# Patient Record
(demographics unavailable — no encounter records)

---

## 2019-08-24 NOTE — XRAY REPORT
CHEST 1 VIEW 8/24/2019 9:51 PM



INDICATION / CLINICAL INFORMATION:

Overdose.



COMPARISON: 

None available.



FINDINGS:



SUPPORT DEVICES: None.

HEART / MEDIASTINUM: No significant abnormality. 

LUNGS / PLEURA: No significant pulmonary or pleural abnormality. No pneumothorax. 



ADDITIONAL FINDINGS: No significant additional findings.



IMPRESSION:

No acute abnormality of the chest.



Signer Name: Khoi Jarvis MD 

Signed: 8/24/2019 10:18 PM

 Workstation Name: [id number]

## 2019-08-24 NOTE — EVENT NOTE
Date: 08/24/19





patient with caustic ingestion, will recommend early endoscopy (tomorrow AM) as 

long as no signs/symptoms of perforation, as early endoscopy can accurately 

predict risk of stricture formation


Please maintain NPO

## 2019-08-24 NOTE — HISTORY AND PHYSICAL REPORT
History of Present Illness


Date of examination: 08/24/19


History of present illness: 


21-year-old woman with no medical problems was brought to the emergency room for

evaluation.  Family at bedside state that she drinks 6 beers today, she then 

felt dizzy and does not remember what happened.  Also state that she has been 

demon possessed, edema and still hurry to do things to herself.  Today after 

drinking 6 beers she drinks some cleaning agent, but she does not know the name,

she was not aware that she was drinking 8, she said demons told her to do it.  

Currently he has burning in her stomach, no psychiatric history


Review Of  Systems:


Constitutional: no weight loss, fever, chills


Ears, eyes, nose, mouth and throat: no nasal congestion, no nasal discharge, no 

sinus pressure, blurry vision, diplopia


Neck: No neck pain or rigidity.


Cardiovascular: No  palpitations, chest pain


Respiratory: No shortness of breath, cough


Gastrointestinal: No hematochezia, abdominal pain


Genitourinary : no dysuria, frequency , hematuria


Musculoskeletal: no muscle ache , joint pain


Integumentary: no rash, no pruritis


Neurological: no parathesias, focal weakness


Endocrine: no cold or heat intolerance, no polyuria or polydipsia


Hematologic/Lymphatic: no easy bruising, no easy bleeding, no gland swelling


Allergic/Immunologic: no urticaria, no angioedema.





PAST MEDICAL HISTORY:None





PAST SURGICAL HISTORY:None





FAMILY HISTORY:hypertension, diabetes





SOCIAL HISTORY: Denies  tobacco, drugs,  +alcohol











Medications and Allergies


                                    Allergies











Allergy/AdvReac Type Severity Reaction Status Date / Time


 


No Known Allergies Allergy   Unverified 08/24/19 22:46











                                Home Medications











 Medication  Instructions  Recorded  Confirmed  Last Taken  Type


 


No Known Home Medications [No  08/24/19 08/24/19 Unknown History





Reported Home Medications]     











Active Meds: 


Active Medications





Acetaminophen (Tylenol)  650 mg PO Q4H PRN


   PRN Reason: Pain MILD(1-3)/Fever >100.5/HA


Enoxaparin Sodium (Lovenox)  30 mg SUB-Q QDAY [facility]


Sodium Chloride (Nacl 0.9% 1000 Ml)  1,000 mls @ 150 mls/hr IV AS DIRECT [facility]


Morphine Sulfate (Morphine)  2 mg IV Q4H PRN


   PRN Reason: Pain, Moderate (4-6)


Ondansetron HCl (Zofran)  4 mg IV Q8H PRN


   PRN Reason: Nausea And Vomiting


Sodium Chloride (Sodium Chloride Flush Syringe 10 Ml)  10 ml IV BID [facility]


Sodium Chloride (Sodium Chloride Flush Syringe 10 Ml)  10 ml IV PRN PRN


   PRN Reason: LINE FLUSH











Exam





- Physical Exam


Narrative exam: 


General Apperance: The patient sitting in bed no acute distress


HEENT: Normocephalic, atraumatic.  Pupils equally round and reactive to light, 

extraocular movement intact, and no sclericterus or JVD or thyromegaly or 

nodule.  Neck supple, no carotid bruit, mucous membranes moist, no exudate or 

erythema


Heart: S1-S2, regular is rhythm


Lungs: Clear to auscultation bilaterally, breathing comfortable


Abdomen: Positive bowel sounds, soft, nontender, nondistended, no organomegaly


Extremities: No edema cyanosis clubbing


Skin: no rash, nodule, warm and dry


Neuro:CN 2 -12 intact, motor/sensory intact, speech is fluent








- Constitutional


Vitals: 


                                        











Temp Pulse Resp BP Pulse Ox


 


 98.6 F   111 H  19   108/65   98 


 


 08/24/19 21:20  08/24/19 23:30  08/24/19 23:30  08/24/19 23:30  08/24/19 23:30














Results





- Labs


CBC & Chem 7: 


                                 08/25/19 04:40





                                 08/25/19 04:40


Labs: 


                              Abnormal lab results











  08/24/19 08/24/19 08/24/19 Range/Units





  21:25 21:25 21:25 


 


MCHC  35 H    (30-34)  %


 


BUN   6 L   (7-17)  mg/dL


 


Creatinine   0.6 L   (0.7-1.2)  mg/dL


 


Glucose   109 H   ()  mg/dL


 


AST   70 H   (5-40)  units/L


 


ALT   109 H   (7-56)  units/L


 


Total Creatine Kinase    166 H  ()  units/L


 


Total Protein   8.6 H   (6.3-8.2)  g/dL


 


Salicylates     (2.8-20.0)  mg/dL


 


Acetaminophen     (10.0-30.0)  ug/mL


 


Plasma/Serum Alcohol     (0-0.07)  %














  08/24/19 08/24/19 08/24/19 Range/Units





  21:25 21:25 21:25 


 


MCHC     (30-34)  %


 


BUN     (7-17)  mg/dL


 


Creatinine     (0.7-1.2)  mg/dL


 


Glucose     ()  mg/dL


 


AST     (5-40)  units/L


 


ALT     (7-56)  units/L


 


Total Creatine Kinase     ()  units/L


 


Total Protein     (6.3-8.2)  g/dL


 


Salicylates  < 0.3 L    (2.8-20.0)  mg/dL


 


Acetaminophen   < 5.0 L   (10.0-30.0)  ug/mL


 


Plasma/Serum Alcohol    0.18 H  (0-0.07)  %














- Imaging and Cardiology


Chest x-ray: report reviewed





Assessment and Plan


Assessment


Ingestion of caustic agent


Suicide attempt





Plan


Start IV fluids, Protonix, GI was consulted to see the patient


Consult psych, 1013, DVT prophylaxis

## 2019-08-24 NOTE — XRAY REPORT
CERVICAL SPINE 3 VIEWS.



INDICATION / CLINICAL INFORMATION:

fell intox psych



COMPARISON:

None available.

 

FINDINGS:



BONES / JOINT(S): No acute fracture or subluxation. No significant arthritis.

SOFT TISSUES: No significant abnormality.



ADDITIONAL FINDINGS: None.







Signer Name: Alon Bales MD 

Signed: 8/24/2019 11:40 PM

 Workstation Name: [id number]

## 2019-08-24 NOTE — CAT SCAN REPORT
NONENHANCED CT SCAN OF THE NECK:



INDICATION:

Fell down; underresponsive; "drank ammonia"



TECHNIQUE: Routine CT head without contrast. Sagittal and coronal reformatted images were obtained. A
ll CT scans at this location are performed using CT dose reduction for ALARA by means of automated ex
posure control.



COMPARISON: 

None.



FINDINGS:



BRAIN / INTRACRANIAL CONTENTS: No acute hemorrhage, mass effect, midline shift, hydrocephalus, or acu
te, large territorial infarct. No chronic infarct or focal atrophy. Normal brain volume and ventricul
ar/sulcal size for age. No significant white matter abnormality. Basal ganglia are normal.



CRANIOCERVICAL JUNCTION: No significant abnormality.



ORBITS: No significant abnormality of visualized orbits.

SINUSES / MASTOIDS: No significant abnormality of the visualized paranasal sinuses or mastoid air stephon
ls.



ADDITIONAL FINDINGS: None. 



IMPRESSION:

CT scan of the brain.



Signer Name: Casie Gaviria MD 

Signed: 8/24/2019 10:51 PM

 Workstation Name: [id number]

## 2019-08-25 NOTE — GASTROENTEROLOGY CONSULTATION
History of Present Illness





- Reason for Consult


Consult date: 19


Caustic Ingestion


Requesting physician: WINNIE SHULTZ





- History of Present Illness





 #[id number]





Patient reports drinking combination of bleach/ammonia from her bathroom 

cleaning supplies, she reports not sure of the volume that she drank, just that 

she drank until she started to feel bad.


Currently reports mouth a little sore, otherwise feeling ok.





Past History


Past Medical History: No medical history, other (Unable to obtain )


Past Surgical History: , Other


Social history: no significant social history, other (Unable to obtain )


Family history: no significant family history





Medications and Allergies


                                    Allergies











Allergy/AdvReac Type Severity Reaction Status Date / Time


 


No Known Allergies Allergy   Unverified 19 22:46











                                Home Medications











 Medication  Instructions  Recorded  Confirmed  Last Taken  Type


 


No Known Home Medications [No  19 Unknown History





Reported Home Medications]     











Active Meds: 


Active Medications





Acetaminophen (Tylenol)  650 mg PO Q4H PRN


   PRN Reason: Pain MILD(1-3)/Fever >100.5/HA


Enoxaparin Sodium (Lovenox)  40 mg SUB-Q QDAY [facility]


   Last Admin: 19 09:44 Dose:  40 mg


   Documented by: 


Sodium Chloride (Nacl 0.9% 1000 Ml)  1,000 mls @ 150 mls/hr IV AS DIRECT [facility]


   Last Admin: 19 04:58 Dose:  150 mls/hr


   Documented by: 


Sodium Chloride (Nacl 0.9% 1000 Ml)  1,000 mls @ 50 mls/hr IV AS DIRECT CALI


Morphine Sulfate (Morphine)  2 mg IV Q4H PRN


   PRN Reason: Pain, Moderate (4-6)


Ondansetron HCl (Zofran)  4 mg IV Q8H PRN


   PRN Reason: Nausea And Vomiting


Pantoprazole Sodium (Protonix)  40 mg IV BID [facility]


   Last Admin: 19 09:44 Dose:  40 mg


   Documented by: 


Sodium Chloride (Sodium Chloride Flush Syringe 10 Ml)  10 ml IV BID [facility]


   Last Admin: 19 09:44 Dose:  10 ml


   Documented by: 


Sodium Chloride (Sodium Chloride Flush Syringe 10 Ml)  10 ml IV PRN PRN


   PRN Reason: LINE FLUSH











Review of Systems





- Review of Systems


All systems: negative (mouth pain)





Exam





- Constitutional


Vital Signs: 


                                        











Temp Pulse Resp BP Pulse Ox


 


 98.4 F   98 H  16   117/71   99 


 


 19 08:00  19 09:00  19 08:00  19 06:00  19 08:00











General appearance: no acute distress





- EENT


Eyes: EOM intact


ENT: other (sloughing of the mucosa of the tongue which is slightly edamatous 

and red; posterior oropharynx does not appear to have significant damage in my 

limited view)





- Neck


Neck: supple





- Respiratory


Respiratory effort: normal





- Cardiovascular


Rhythm: regular





- Gastrointestinal


General gastrointestinal: Present: soft, non-tender, normal bowel sounds





- Integumentary


Integumentary: Present: warm, dry





- Musculoskeletal


Musculoskeletal: normal





- Neurologic


Neurological: alert and oriented x3





- Psychiatric


Psychiatric: appropriate mood/affect





- Labs


CBC & Chem 7: 


                                 19 04:40





                                 19 04:40


Lab Results: 


                         Laboratory Results - last 24 hr











  19





  21:25 21:25 21:25


 


WBC  7.6  


 


RBC  4.72  


 


Hgb  13.8  


 


Hct  39.9  


 


MCV  85  


 


MCH  29  


 


MCHC  35 H  


 


RDW  13.2  


 


Plt Count  395  


 


Lymph % (Auto)   


 


Mono % (Auto)   


 


Eos % (Auto)   


 


Baso % (Auto)   


 


Lymph #   


 


Mono #   


 


Eos #   


 


Baso #   


 


Seg Neutrophils %   


 


Seg Neutrophils #   


 


PT   13.4 


 


INR   1.05 


 


APTT   27.7 


 


Sodium    145


 


Potassium    4.0


 


Chloride    105.8


 


Carbon Dioxide    22


 


Anion Gap    21


 


BUN    6 L


 


Creatinine    0.6 L


 


Estimated GFR    > 60


 


BUN/Creatinine Ratio    10


 


Glucose    109 H


 


Calcium    9.3


 


Magnesium   


 


Total Bilirubin    0.20


 


AST    70 H


 


ALT    109 H


 


Alkaline Phosphatase    72


 


Total Creatine Kinase   


 


Total Protein    8.6 H


 


Albumin    4.7


 


Albumin/Globulin Ratio    1.2


 


Urine Color   


 


Urine Turbidity   


 


Urine pH   


 


Ur Specific Gravity   


 


Urine Protein   


 


Urine Glucose (UA)   


 


Urine Ketones   


 


Urine Blood   


 


Urine Nitrite   


 


Urine Bilirubin   


 


Urine Urobilinogen   


 


Ur Leukocyte Esterase   


 


Urine WBC (Auto)   


 


Urine RBC (Auto)   


 


U Epithel Cells (Auto)   


 


Urine HCG, Qual   


 


Salicylates   


 


Urine Opiates Screen   


 


Urine Methadone Screen   


 


Acetaminophen   


 


Ur Barbiturates Screen   


 


Ur Phencyclidine Scrn   


 


Ur Amphetamines Screen   


 


U Benzodiazepines Scrn   


 


Urine Cocaine Screen   


 


U Marijuana (THC) Screen   


 


Drugs of Abuse Note   


 


Plasma/Serum Alcohol   














  19





  21:25 21:25 21:25


 


WBC  7.6  


 


RBC  4.71  


 


Hgb  13.8  


 


Hct  40.2  


 


MCV  85  


 


MCH  29  


 


MCHC  34  


 


RDW  13.2  


 


Plt Count  396  


 


Lymph % (Auto)   


 


Mono % (Auto)   


 


Eos % (Auto)   


 


Baso % (Auto)   


 


Lymph #   


 


Mono #   


 


Eos #   


 


Baso #   


 


Seg Neutrophils %   


 


Seg Neutrophils #   


 


PT   13.3 


 


INR   1.04 


 


APTT   


 


Sodium   


 


Potassium   


 


Chloride   


 


Carbon Dioxide   


 


Anion Gap   


 


BUN   


 


Creatinine   


 


Estimated GFR   


 


BUN/Creatinine Ratio   


 


Glucose   


 


Calcium   


 


Magnesium    2.20


 


Total Bilirubin   


 


AST   


 


ALT   


 


Alkaline Phosphatase   


 


Total Creatine Kinase    166 H


 


Total Protein   


 


Albumin   


 


Albumin/Globulin Ratio   


 


Urine Color   


 


Urine Turbidity   


 


Urine pH   


 


Ur Specific Gravity   


 


Urine Protein   


 


Urine Glucose (UA)   


 


Urine Ketones   


 


Urine Blood   


 


Urine Nitrite   


 


Urine Bilirubin   


 


Urine Urobilinogen   


 


Ur Leukocyte Esterase   


 


Urine WBC (Auto)   


 


Urine RBC (Auto)   


 


U Epithel Cells (Auto)   


 


Urine HCG, Qual   


 


Salicylates   


 


Urine Opiates Screen   


 


Urine Methadone Screen   


 


Acetaminophen   


 


Ur Barbiturates Screen   


 


Ur Phencyclidine Scrn   


 


Ur Amphetamines Screen   


 


U Benzodiazepines Scrn   


 


Urine Cocaine Screen   


 


U Marijuana (THC) Screen   


 


Drugs of Abuse Note   


 


Plasma/Serum Alcohol   














  19





  21:25 21:25 21:25


 


WBC   


 


RBC   


 


Hgb   


 


Hct   


 


MCV   


 


MCH   


 


MCHC   


 


RDW   


 


Plt Count   


 


Lymph % (Auto)   


 


Mono % (Auto)   


 


Eos % (Auto)   


 


Baso % (Auto)   


 


Lymph #   


 


Mono #   


 


Eos #   


 


Baso #   


 


Seg Neutrophils %   


 


Seg Neutrophils #   


 


PT   


 


INR   


 


APTT   


 


Sodium   


 


Potassium   


 


Chloride   


 


Carbon Dioxide   


 


Anion Gap   


 


BUN   


 


Creatinine   


 


Estimated GFR   


 


BUN/Creatinine Ratio   


 


Glucose   


 


Calcium   


 


Magnesium   


 


Total Bilirubin   


 


AST   


 


ALT   


 


Alkaline Phosphatase   


 


Total Creatine Kinase   


 


Total Protein   


 


Albumin   


 


Albumin/Globulin Ratio   


 


Urine Color   


 


Urine Turbidity   


 


Urine pH   


 


Ur Specific Gravity   


 


Urine Protein   


 


Urine Glucose (UA)   


 


Urine Ketones   


 


Urine Blood   


 


Urine Nitrite   


 


Urine Bilirubin   


 


Urine Urobilinogen   


 


Ur Leukocyte Esterase   


 


Urine WBC (Auto)   


 


Urine RBC (Auto)   


 


U Epithel Cells (Auto)   


 


Urine HCG, Qual   


 


Salicylates  < 0.3 L  


 


Urine Opiates Screen   


 


Urine Methadone Screen   


 


Acetaminophen   < 5.0 L 


 


Ur Barbiturates Screen   


 


Ur Phencyclidine Scrn   


 


Ur Amphetamines Screen   


 


U Benzodiazepines Scrn   


 


Urine Cocaine Screen   


 


U Marijuana (THC) Screen   


 


Drugs of Abuse Note   


 


Plasma/Serum Alcohol    0.18 H














  19





  23:18 23:18 04:40


 


WBC    9.5


 


RBC    4.45


 


Hgb    13.2


 


Hct    38.4


 


MCV    86


 


MCH    30


 


MCHC    34


 


RDW    13.1 L


 


Plt Count    375


 


Lymph % (Auto)    38.4 H


 


Mono % (Auto)    7.5 H


 


Eos % (Auto)    1.2


 


Baso % (Auto)    0.7


 


Lymph #    3.6


 


Mono #    0.7


 


Eos #    0.1


 


Baso #    0.1


 


Seg Neutrophils %    52.2


 


Seg Neutrophils #    5.0


 


PT   


 


INR   


 


APTT   


 


Sodium   


 


Potassium   


 


Chloride   


 


Carbon Dioxide   


 


Anion Gap   


 


BUN   


 


Creatinine   


 


Estimated GFR   


 


BUN/Creatinine Ratio   


 


Glucose   


 


Calcium   


 


Magnesium   


 


Total Bilirubin   


 


AST   


 


ALT   


 


Alkaline Phosphatase   


 


Total Creatine Kinase   


 


Total Protein   


 


Albumin   


 


Albumin/Globulin Ratio   


 


Urine Color  Straw  


 


Urine Turbidity  Clear  


 


Urine pH  5.0  


 


Ur Specific Gravity  1.006  


 


Urine Protein  <15 mg/dl  


 


Urine Glucose (UA)  Neg  


 


Urine Ketones  Neg  


 


Urine Blood  Neg  


 


Urine Nitrite  Neg  


 


Urine Bilirubin  Neg  


 


Urine Urobilinogen  < 2.0  


 


Ur Leukocyte Esterase  Neg  


 


Urine WBC (Auto)  1.0  


 


Urine RBC (Auto)  < 1.0  


 


U Epithel Cells (Auto)  1.0  


 


Urine HCG, Qual   


 


Salicylates   


 


Urine Opiates Screen   Presumptive negative 


 


Urine Methadone Screen   Presumptive negative 


 


Acetaminophen   


 


Ur Barbiturates Screen   Presumptive negative 


 


Ur Phencyclidine Scrn   Presumptive negative 


 


Ur Amphetamines Screen   Presumptive negative 


 


U Benzodiazepines Scrn   Presumptive negative 


 


Urine Cocaine Screen   Presumptive negative 


 


U Marijuana (THC) Screen   Presumptive negative 


 


Drugs of Abuse Note   Disclamer 


 


Plasma/Serum Alcohol   














  19





  04:40 09:32


 


WBC  


 


RBC  


 


Hgb  


 


Hct  


 


MCV  


 


MCH  


 


MCHC  


 


RDW  


 


Plt Count  


 


Lymph % (Auto)  


 


Mono % (Auto)  


 


Eos % (Auto)  


 


Baso % (Auto)  


 


Lymph #  


 


Mono #  


 


Eos #  


 


Baso #  


 


Seg Neutrophils %  


 


Seg Neutrophils #  


 


PT  


 


INR  


 


APTT  


 


Sodium  145 


 


Potassium  4.0 


 


Chloride  107.5 H 


 


Carbon Dioxide  21 L 


 


Anion Gap  21 


 


BUN  6 L 


 


Creatinine  0.6 L 


 


Estimated GFR  > 60 


 


BUN/Creatinine Ratio  10 


 


Glucose  86 


 


Calcium  8.7 


 


Magnesium  


 


Total Bilirubin  


 


AST  


 


ALT  


 


Alkaline Phosphatase  


 


Total Creatine Kinase  


 


Total Protein  


 


Albumin  


 


Albumin/Globulin Ratio  


 


Urine Color  


 


Urine Turbidity  


 


Urine pH  


 


Ur Specific Gravity  


 


Urine Protein  


 


Urine Glucose (UA)  


 


Urine Ketones  


 


Urine Blood  


 


Urine Nitrite  


 


Urine Bilirubin  


 


Urine Urobilinogen  


 


Ur Leukocyte Esterase  


 


Urine WBC (Auto)  


 


Urine RBC (Auto)  


 


U Epithel Cells (Auto)  


 


Urine HCG, Qual   Negative


 


Salicylates  


 


Urine Opiates Screen  


 


Urine Methadone Screen  


 


Acetaminophen  


 


Ur Barbiturates Screen  


 


Ur Phencyclidine Scrn  


 


Ur Amphetamines Screen  


 


U Benzodiazepines Scrn  


 


Urine Cocaine Screen  


 


U Marijuana (THC) Screen  


 


Drugs of Abuse Note  


 


Plasma/Serum Alcohol  














Assessment and Plan





Caustic ingestion with bleach and ammonia, will need EGD for risk stratification

for stricture formation and perforation/fistulization, no evidence for 

perforation so will proceed with urgent EGD.





This was all discussed in detail with the patient using the telephone 

 and questions were answered





Long term, patient will be at increased risk of esophageal cancer and so will 

need to follow up with me starting in  (10 years after the ingestion) as 

long as remains asymptomatic with no dysphagia, if she develops symptoms then 

she would need to see me sooner





Final recs based upon EGD results





- Patient Problems


(1) Ingestion of caustic substance


Current Visit: Yes   Status: Acute

## 2019-08-25 NOTE — POST ANESTHESIA EVALUATION
- Post Anesthesia Evaluation


Patient Participated: Yes


Airway Patent: Yes


Stable Respiratory Function: Yes


Nausea/Vomiting: No


Temp > 96.8F: Yes


Pain Manageable: Yes


Adequeate Hydration: Yes (Required extra IVF)


Anesthesia Complications: No


Block Receding Appropriately: Not Applicable


Patient on Ventilator: No

## 2019-08-25 NOTE — OPERATIVE REPORT
Operative Report


Operative Report: 





DOS: 8/25/19


 


SURGEON: Dilan Alvarez MD


 


EGD REPORT


 


PREOPERATIVE DIAGNOSIS and POSTOPERATIVE DIAGNOSIS: Caustic Ingestion


 


ESTIMATED BLOOD LOSS: none


 


DESCRIPTION OF PROCEDURE: A high-resolution EGD scope was passed through the 

oropharynx, esophagus, stomach, and second portion of duodenum. The scope was 

carefully withdrawn. Retroflexion was performed in the stomach. At the end of 

the procedure, the scope was cleaned using normal technique. Vital signs 

monitored continuously throughout.


 


SEDATION: Provided by [facility].


 


COMPLICATIONS: None.


 


FINDINGS: 


* Normal duodenum


* Mild diffuse gastritis of the stomach with erythema


* GE junction at 37cm from the incisors


* Per Zargar Classification, patient with 2A grade esophageal injury.  

  Specifically, diffuse mucosal exudates and sloughing of mucosa without 

  necrosis nor deep ulcers


 


RECOMMENDATIONS: 


* May start liquid diet now


* May advance to regular diet tomorrow if patient clinically continues to do 

  well


* Recommend PPI to prevent stress ulcers for the next 30 days


* Patient now unfortunately is at significantly elevated risk for esophageal 

  cancer and requires EGD every 2-3 years starting in about 10 years from now.


* Patient fortunately has low risk of perforation or stricture formation based 

  upon the EGD appearance and therefore after discharge can follow up with GI 

  PRN (until 10 years from now at which point she will need routine EGD's as 

  above)


* Lastly, final disposition for psych eval given suicide attempt

## 2019-08-25 NOTE — CONSULTATION
History of Present Illness





- Reason for Consult


Consult date: 08/25/19


Reason for consult: Mental Health Evaluation


Requesting physician: WINNIE SHULTZ





- Chief Complaint


Chief complaint: 


"The patient speak [place]"




















- History of Present Psychiatric Illness


21 y.o.  female who presented to the ER for suicide attempt by drinking 

bleach and ammonia per the record. Today the assessment could not be completed 

because the  phone isn't working. Will attempt to assess the patient 

in 24 hours. 





Medications and Allergies


                                    Allergies











Allergy/AdvReac Type Severity Reaction Status Date / Time


 


No Known Allergies Allergy   Unverified 08/24/19 22:46











                                Home Medications











 Medication  Instructions  Recorded  Confirmed  Last Taken  Type


 


No Known Home Medications [No  08/24/19 08/24/19 Unknown History





Reported Home Medications]     











Active Meds: 


Active Medications





Acetaminophen (Tylenol)  650 mg PO Q4H PRN


   PRN Reason: Pain MILD(1-3)/Fever >100.5/HA


Enoxaparin Sodium (Lovenox)  40 mg SUB-Q QDAY [facility]


   Last Admin: 08/25/19 09:44 Dose:  40 mg


   Documented by: 


Sodium Chloride (Nacl 0.9% 1000 Ml)  1,000 mls @ 150 mls/hr IV AS DIRECT CALI


   Last Admin: 08/25/19 04:58 Dose:  150 mls/hr


   Documented by: 


Sodium Chloride (Nacl 0.9% 1000 Ml)  1,000 mls @ 50 mls/hr IV AS DIRECT CALI


Morphine Sulfate (Morphine)  2 mg IV Q4H PRN


   PRN Reason: Pain, Moderate (4-6)


Ondansetron HCl (Zofran)  4 mg IV Q8H PRN


   PRN Reason: Nausea And Vomiting


Pantoprazole Sodium (Protonix)  40 mg IV BID [facility]


   Last Admin: 08/25/19 09:44 Dose:  40 mg


   Documented by: 


Sodium Chloride (Sodium Chloride Flush Syringe 10 Ml)  10 ml IV BID [facility]


   Last Admin: 08/25/19 09:44 Dose:  10 ml


   Documented by: 


Sodium Chloride (Sodium Chloride Flush Syringe 10 Ml)  10 ml IV PRN PRN


   PRN Reason: LINE FLUSH











Past psychiatric history





- Past Medical History


Past Medical History: other (Unable to obtain )


Past Surgical History: Other (Unable to obtain )





- past Psychiatric treatment and history


psychiatric treatment history: 


Unable to obtain a psy hx and fam psy hx. 








- Social History


Social history: other (Unable to obtain )





Mental Status Exam





- Vital signs


                                Last Vital Signs











Temp  98.4 F   08/25/19 08:00


 


Pulse  98 H  08/25/19 09:00


 


Resp  16   08/25/19 08:00


 


BP  117/71   08/25/19 06:00


 


Pulse Ox  99   08/25/19 08:00














- Exam


Narrative exam: 


Unable to complete the MSE at this time. 








Results


Result Diagrams: 


                                 08/25/19 04:40





                                 08/25/19 04:40


                              Abnormal lab results











  08/24/19 08/24/19 08/24/19 Range/Units





  21:25 21:25 21:25 


 


MCHC  35 H    (30-34)  %


 


RDW     (13.2-15.2)  %


 


Lymph % (Auto)     (13.4-35.0)  %


 


Mono % (Auto)     (0.0-7.3)  %


 


Chloride     ()  mmol/L


 


Carbon Dioxide     (22-30)  mmol/L


 


BUN   6 L   (7-17)  mg/dL


 


Creatinine   0.6 L   (0.7-1.2)  mg/dL


 


Glucose   109 H   ()  mg/dL


 


AST   70 H   (5-40)  units/L


 


ALT   109 H   (7-56)  units/L


 


Total Creatine Kinase    166 H  ()  units/L


 


Total Protein   8.6 H   (6.3-8.2)  g/dL


 


Salicylates     (2.8-20.0)  mg/dL


 


Acetaminophen     (10.0-30.0)  ug/mL


 


Plasma/Serum Alcohol     (0-0.07)  %














  08/24/19 08/24/19 08/24/19 Range/Units





  21:25 21:25 21:25 


 


MCHC     (30-34)  %


 


RDW     (13.2-15.2)  %


 


Lymph % (Auto)     (13.4-35.0)  %


 


Mono % (Auto)     (0.0-7.3)  %


 


Chloride     ()  mmol/L


 


Carbon Dioxide     (22-30)  mmol/L


 


BUN     (7-17)  mg/dL


 


Creatinine     (0.7-1.2)  mg/dL


 


Glucose     ()  mg/dL


 


AST     (5-40)  units/L


 


ALT     (7-56)  units/L


 


Total Creatine Kinase     ()  units/L


 


Total Protein     (6.3-8.2)  g/dL


 


Salicylates  < 0.3 L    (2.8-20.0)  mg/dL


 


Acetaminophen   < 5.0 L   (10.0-30.0)  ug/mL


 


Plasma/Serum Alcohol    0.18 H  (0-0.07)  %














  08/25/19 08/25/19 Range/Units





  04:40 04:40 


 


MCHC    (30-34)  %


 


RDW  13.1 L   (13.2-15.2)  %


 


Lymph % (Auto)  38.4 H   (13.4-35.0)  %


 


Mono % (Auto)  7.5 H   (0.0-7.3)  %


 


Chloride   107.5 H  ()  mmol/L


 


Carbon Dioxide   21 L  (22-30)  mmol/L


 


BUN   6 L  (7-17)  mg/dL


 


Creatinine   0.6 L  (0.7-1.2)  mg/dL


 


Glucose    ()  mg/dL


 


AST    (5-40)  units/L


 


ALT    (7-56)  units/L


 


Total Creatine Kinase    ()  units/L


 


Total Protein    (6.3-8.2)  g/dL


 


Salicylates    (2.8-20.0)  mg/dL


 


Acetaminophen    (10.0-30.0)  ug/mL


 


Plasma/Serum Alcohol    (0-0.07)  %








All other labs normal.








Assessment and Plan


Assessment and plan: 


Impression: Today the assessment could not be completed at this time because the

 phone isn't working.





Recommendation/Plan: Continue 1013 and attempt to reassess the patient in 24 

hours. 





Will staff with Dr LUH Spencer.

## 2019-08-25 NOTE — PROGRESS NOTE
Assessment and Plan


Assessment and plan: 


History of present illness: 


21-year-old woman with no medical problems was brought to the emergency room for

evaluation.  Family at bedside state that she drinks 6 beers today, she then 

felt dizzy and does not remember what happened.  Also state that she has been 

demon possessed, edema and still hurry to do things to herself.  after drinking 

6 beers she drinks some cleaning agent, but she does not know the name, she was 

not aware that she was drinking 8, she said demons told her to do it.  Currently

he has burning in her stomach, no psychiatric history





SI, attempt


1013, MH consult





Gastritis


-likely due to drinking caustic cleaning material


-EGD report reviewed, dw GI, diet started














History


Interval history: 





she continues to feel depressed, and feels like her demons are chasing her


She has epigastric pain but it is improved


no vomiting


no cp, no sob, no fever





Hospitalist Physical





- Physical exam


Narrative exam: 





General appearance: Present: no acute distress





- EENT


Eyes: Present: PERRL


ENT: hearing intact





- Neck


Neck: Present: supple





- Respiratory


Respiratory effort: normal


Respiratory: bilateral: CTA





- Cardiovascular


Rhythm: regular


Heart Sounds: Present: S1 & S2





- Extremities


Extremities: no ischemia





- Abdominal


General gastrointestinal: soft, non-tender





- Integumentary


Integumentary: Present: clear, warm





- Psychiatric


Psychiatric: no appropriate mood/affect, no intact judgment & insight





- Neurologic


Neurologic: CNII-XII intact, no focal deficits





- Constitutional


Vitals: 


                                        











Temp Pulse Resp BP Pulse Ox


 


 98.7 F   104 H  14   100/49   97 


 


 08/25/19 20:00  08/25/19 17:00  08/25/19 16:00  08/25/19 15:00  08/25/19 16:00














Results





- Labs


CBC & Chem 7: 


                                 08/26/19 09:39





                                 08/26/19 09:39


Labs: 


                             Laboratory Last Values











WBC  9.5 K/mm3 (4.5-11.0)   08/25/19  04:40    


 


RBC  4.45 M/mm3 (3.65-5.03)   08/25/19  04:40    


 


Hgb  13.2 gm/dl (10.1-14.3)   08/25/19  04:40    


 


Hct  38.4 % (30.3-42.9)   08/25/19  04:40    


 


MCV  86 fl (79-97)   08/25/19  04:40    


 


MCH  30 pg (28-32)   08/25/19  04:40    


 


MCHC  34 % (30-34)   08/25/19  04:40    


 


RDW  13.1 % (13.2-15.2)  L  08/25/19  04:40    


 


Plt Count  375 K/mm3 (140-440)   08/25/19  04:40    


 


Lymph % (Auto)  38.4 % (13.4-35.0)  H  08/25/19  04:40    


 


Mono % (Auto)  7.5 % (0.0-7.3)  H  08/25/19  04:40    


 


Eos % (Auto)  1.2 % (0.0-4.3)   08/25/19  04:40    


 


Baso % (Auto)  0.7 % (0.0-1.8)   08/25/19  04:40    


 


Lymph #  3.6 K/mm3 (1.2-5.4)   08/25/19  04:40    


 


Mono #  0.7 K/mm3 (0.0-0.8)   08/25/19  04:40    


 


Eos #  0.1 K/mm3 (0.0-0.4)   08/25/19  04:40    


 


Baso #  0.1 K/mm3 (0.0-0.1)   08/25/19  04:40    


 


Seg Neutrophils %  52.2 % (40.0-70.0)   08/25/19  04:40    


 


Seg Neutrophils #  5.0 K/mm3 (1.8-7.7)   08/25/19  04:40    


 


PT  13.3 Sec. (12.2-14.9)   08/24/19  21:25    


 


PT  13.4 Sec. (12.2-14.9)   08/24/19  21:25    


 


INR  1.04  (0.87-1.13)   08/24/19  21:25    


 


INR  1.05  (0.87-1.13)   08/24/19  21:25    


 


APTT  27.7 Sec. (24.2-36.6)   08/24/19  21:25    


 


Sodium  145 mmol/L (137-145)   08/25/19  04:40    


 


Potassium  4.0 mmol/L (3.6-5.0)   08/25/19  04:40    


 


Chloride  107.5 mmol/L ()  H  08/25/19  04:40    


 


Carbon Dioxide  21 mmol/L (22-30)  L  08/25/19  04:40    


 


  21 mmol/L  08/25/19  04:40    


 


BUN  6 mg/dL (7-17)  L  08/25/19  04:40    


 


  0.6 mg/dL (0.7-1.2)  L  08/25/19  04:40    


 


Estimated GFR  > 60 ml/min  08/25/19  04:40    


 


  10 %  08/25/19  04:40    


 


Glucose  86 mg/dL ()   08/25/19  04:40    


 


Calcium  8.7 mg/dL (8.4-10.2)   08/25/19  04:40    


 


Magnesium  2.20 mg/dL (1.7-2.3)   08/24/19  21:25    


 


  0.20 mg/dL (0.1-1.2)   08/24/19  21:25    


 


AST  70 units/L (5-40)  H  08/24/19  21:25    


 


ALT  109 units/L (7-56)  H  08/24/19  21:25    


 


  72 units/L ()   08/24/19  21:25    


 


  166 units/L ()  H  08/24/19  21:25    


 


  8.6 g/dL (6.3-8.2)  H  08/24/19  21:25    


 


  4.7 g/dL (3.9-5)   08/24/19  21:25    


 


  1.2 %  08/24/19  21:25    


 


HCG, Qual  Negative  (Negative)   08/25/19  08:14    


 


  Straw  (Yellow)   08/24/19  23:18    


 


  Clear  (Clear)   08/24/19  23:18    


 


  5.0  (5.0-7.0)   08/24/19  23:18    


 


Ur Specific Gravity  1.006  (1.003-1.030)   08/24/19  23:18    


 


  <15 mg/dl mg/dL (Negative)   08/24/19  23:18    


 


  Neg mg/dL (Negative)   08/24/19  23:18    


 


  Neg mg/dL (Negative)   08/24/19  23:18    


 


  Neg  (Negative)   08/24/19  23:18    


 


  Neg  (Negative)   08/24/19  23:18    


 


  Neg  (Negative)   08/24/19  23:18    


 


  < 2.0 mg/dL (<2.0)   08/24/19  23:18    


 


Ur Leukocyte Esterase  Neg  (Negative)   08/24/19  23:18    


 


  1.0 /HPF (0.0-6.0)   08/24/19  23:18    


 


  < 1.0 /HPF (0.0-6.0)   08/24/19  23:18    


 


U Epithel Cells (Auto)  1.0 /HPF (0-13.0)   08/24/19  23:18    


 


Urine HCG, Qual  Negative  (Negative)   08/25/19  09:32    


 


Salicylates  < 0.3 mg/dL (2.8-20.0)  L  08/24/19  21:25    


 


  Presumptive negative   08/24/19  23:18    


 


  Presumptive negative   08/24/19  23:18    


 


Acetaminophen  < 5.0 ug/mL (10.0-30.0)  L  08/24/19  21:25    


 


Ur Barbiturates Screen  Presumptive negative   08/24/19  23:18    


 


Ur Phencyclidine Scrn  Presumptive negative   08/24/19  23:18    


 


Ur Amphetamines Screen  Presumptive negative   08/24/19  23:18    


 


U Benzodiazepines Scrn  Presumptive negative   08/24/19  23:18    


 


  Presumptive negative   08/24/19  23:18    


 


U Marijuana (THC) Screen  Presumptive negative   08/24/19  23:18    


 


  Disclamer   08/24/19  23:18    


 


Plasma/Serum Alcohol  0.18 % (0-0.07)  H  08/24/19  21:25    














Active Medications





- Current Medications


Current Medications: 














Generic Name Dose Route Start Last Admin





  Trade Name Freq  PRN Reason Stop Dose Admin


 


Acetaminophen  650 mg  08/24/19 23:35 





  Tylenol  PO  





  Q4H PRN  





  Pain MILD(1-3)/Fever >100.5/HA  


 


Enoxaparin Sodium  40 mg  08/25/19 10:00  08/25/19 09:44





  Lovenox  SUB-Q   40 mg





  QDAY CALI   Administration


 


Sodium Chloride  1,000 mls @ 150 mls/hr  08/24/19 23:45  08/25/19 22:18





  Nacl 0.9% 1000 Ml  IV   150 mls/hr





  AS DIRECT CALI   Administration


 


Sodium Chloride  1,000 mls @ 50 mls/hr  08/25/19 09:00 





  Nacl 0.9% 1000 Ml  IV  





  AS DIRECT CALI  


 


Morphine Sulfate  2 mg  08/24/19 23:35 





  Morphine  IV  





  Q4H PRN  





  Pain, Moderate (4-6)  


 


Ondansetron HCl  4 mg  08/24/19 23:35 





  Zofran  IV  





  Q8H PRN  





  Nausea And Vomiting  


 


Pantoprazole Sodium  40 mg  08/24/19 23:39  08/25/19 22:14





  Protonix  IV   40 mg





  BID CALI   Administration


 


Sodium Chloride  10 ml  08/25/19 10:00  08/25/19 22:15





  Sodium Chloride Flush Syringe 10 Ml  IV   10 ml





  BID CALI   Administration


 


Sodium Chloride  10 ml  08/24/19 23:35 





  Sodium Chloride Flush Syringe 10 Ml  IV  





  PRN PRN  





  LINE FLUSH

## 2019-08-25 NOTE — ANESTHESIA CONSULTATION
Anesthesia Consult and Med Hx


Date of service: 08/25/19





- Airway


Anesthetic Teeth Evaluation: Good


ROM Head & Neck: Adequate


Mental/Hyoid Distance: Adequate


Mallampati Class: Class II


Intubation Access Assessment: Good





- Pre-Operative Health Status


ASA Pre-Surgery Classification: ASA2, Emergency


Proposed Anesthetic Plan: MAC





- Central Nervous System


Hx Psychiatric Problems: Yes (Suicide Attempt)





- Endocrine


Hx Liver Disease: Yes (Elevated LFTs)

## 2019-08-26 NOTE — GASTROENTEROLOGY PROGRESS NOTE
<LAURA REDDY - Last Filed: 08/26/19 10:21>





Assessment and Plan


1.ingestion of caustic substance (bleach and ammonia)


-s/p EGD 8/25/19 that showed:





* Normal duodenum


* Mild diffuse gastritis of the stomach with erythema


* GE junction at 37cm from the incisors


* Per Zargar Classification, patient with 2A grade esophageal injury.  Sp

  ecifically, diffuse mucosal exudates and sloughing of mucosa without necrosis 

  nor deep ulcers





-clinically, patient is tolerating liquids w/o difficulty. Denies abd pain or 

N/v.


-okay to advance diet to regular


-continue PPI to prevent stress ulcer for next 30 days


-repeat labs this am- if labs stable and tolerates advanced diet,  patient okay 

to be d/c per GI standpoint on PPI with f/u in clinic (patient unfortunately is 

at significant elevated risk for esophageal CA and will require routine EGD's 

starting in 2029 (10yrs after the ingestion) or sooner if develops symptoms)


-will sign off, please call if needed





2. suicide attempt- psych following


 


 











Subjective


Date of service: 08/26/19


Principal diagnosis: bleach ingestion


Interval history: 


No acute distress. Denies abd pain or N/v. Tolerating liquids. 








Objective





- Constitutional


Vitals: 


                                        











Temp Pulse Resp BP Pulse Ox


 


 97.6 F   68   14   115/65   99 


 


 08/26/19 08:00  08/26/19 05:00  08/26/19 04:00  08/26/19 02:51  08/26/19 08:09











General appearance: no acute distress





- Respiratory


Respiratory effort: normal





- Cardiovascular


Rhythm: regular





- Gastrointestinal


General gastrointestinal: Present: soft, non-tender, non-distended, normal bowel

sounds





- Labs


CBC & Chem 7: 


                                 08/26/19 09:39





                                 08/26/19 09:39


Labs: 


                         Laboratory Results - last 24 hr











  08/25/19 08/26/19





  08:14 09:39


 


WBC   6.9


 


RBC   4.17


 


Hgb   12.4


 


Hct   36.1


 


MCV   87


 


MCH   30


 


MCHC   34


 


RDW   12.9 L


 


Plt Count   323


 


Lymph % (Auto)   38.3 H


 


Mono % (Auto)   6.3


 


Eos % (Auto)   1.5


 


Baso % (Auto)   0.5


 


Lymph #   2.7


 


Mono #   0.4


 


Eos #   0.1


 


Baso #   0.0


 


Seg Neutrophils %   53.4


 


Seg Neutrophils #   3.7


 


HCG, Qual  Negative 














<COURTNEY VITAL - Last Filed: 08/26/19 21:46>





Assessment and Plan





pt seen and examined and agree with the above, telephone  

utilized, patient reports slight sore mouth otherwise feeling better, will sign 

off with long term plan as above








- Patient Problems


(1) Ingestion of caustic substance


Current Visit: Yes   Status: Acute   





Objective





- Constitutional


Vitals: 


                                        











Temp Pulse Resp BP Pulse Ox


 


 98.8 F   70   18   106/60   99 


 


 08/26/19 16:20  08/26/19 16:20  08/26/19 16:20  08/26/19 16:20  08/26/19 16:20














- Labs


CBC & Chem 7: 


                                 08/26/19 09:39





                                 08/26/19 09:39


Labs: 


                         Laboratory Results - last 24 hr











  08/26/19 08/26/19





  09:39 09:39


 


WBC  6.9 


 


RBC  4.17 


 


Hgb  12.4 


 


Hct  36.1 


 


MCV  87 


 


MCH  30 


 


MCHC  34 


 


RDW  12.9 L 


 


Plt Count  323 


 


Lymph % (Auto)  38.3 H 


 


Mono % (Auto)  6.3 


 


Eos % (Auto)  1.5 


 


Baso % (Auto)  0.5 


 


Lymph #  2.7 


 


Mono #  0.4 


 


Eos #  0.1 


 


Baso #  0.0 


 


Seg Neutrophils %  53.4 


 


Seg Neutrophils #  3.7 


 


Sodium   143


 


Potassium   3.9


 


Chloride   106.8


 


Carbon Dioxide   24


 


Anion Gap   16


 


BUN   5 L


 


Creatinine   0.6 L


 


Estimated GFR   > 60


 


BUN/Creatinine Ratio   8


 


Glucose   130 H


 


Calcium   8.8


 


Total Bilirubin   0.40


 


AST   64 H


 


ALT   90 H


 


Alkaline Phosphatase   53


 


Total Protein   7.0


 


Albumin   3.7 L


 


Albumin/Globulin Ratio   1.1

## 2019-08-26 NOTE — PROGRESS NOTE
Assessment and Plan


Assessment and plan: 


History of present illness: 


21-year-old woman with no medical problems was brought to the emergency room for

evaluation.  Family at bedside state that she drinks 6 beers today, she then 

felt dizzy and does not remember what happened.  Also state that she has been 

demon possessed, edema and still hurry to do things to herself.  after drinking 

6 beers she drinks some cleaning agent, but she does not know the name, she was 

not aware that she was drinking 8, she said demons told her to do it.  Currently

he has burning in her stomach, no psychiatric history





SI, attempt


1013, MH consult





Gastritis


-likely due to drinking caustic cleaning material


-EGD report reviewed, dw GI, diet started


-diffuse gastritis seen on EGD. PPI, needs egd every 2-3 years for the next 10 

years, as her risk is increased for malignancies. 





dvt ppx- scds and early ambulation














History


Interval history: 





she continues to feel depressed, and feels like her demons are chasing her


She has epigastric pain but it is improved


no vomiting


no cp, no sob, no fever





Hospitalist Physical





- Constitutional


Vitals: 


                                        











Temp Pulse Resp BP Pulse Ox


 


 97.6 F   68   14   115/65   99 


 


 08/26/19 08:00  08/26/19 05:00  08/26/19 04:00  08/26/19 02:51  08/26/19 08:09











General appearance: Present: no acute distress





- EENT


Eyes: Present: PERRL


ENT: hearing intact





- Neck


Neck: Present: supple





- Respiratory


Respiratory effort: normal


Respiratory: bilateral: CTA





- Cardiovascular


Rhythm: regular


Heart Sounds: Present: S1 & S2





- Extremities


Extremities: no ischemia





- Abdominal


General gastrointestinal: soft, non-tender





- Integumentary


Integumentary: Present: clear, warm





- Psychiatric


Psychiatric: no appropriate mood/affect, no intact judgment & insight





- Neurologic


Neurologic: CNII-XII intact, no focal deficits





Results





- Labs


CBC & Chem 7: 


                                 08/26/19 09:39





                                 08/26/19 09:39


Labs: 


                             Laboratory Last Values











WBC  6.9 K/mm3 (4.5-11.0)   08/26/19  09:39    


 


RBC  4.17 M/mm3 (3.65-5.03)   08/26/19  09:39    


 


Hgb  12.4 gm/dl (10.1-14.3)   08/26/19  09:39    


 


Hct  36.1 % (30.3-42.9)   08/26/19  09:39    


 


MCV  87 fl (79-97)   08/26/19  09:39    


 


MCH  30 pg (28-32)   08/26/19  09:39    


 


MCHC  34 % (30-34)   08/26/19  09:39    


 


RDW  12.9 % (13.2-15.2)  L  08/26/19  09:39    


 


Plt Count  323 K/mm3 (140-440)   08/26/19  09:39    


 


Lymph % (Auto)  38.3 % (13.4-35.0)  H  08/26/19  09:39    


 


Mono % (Auto)  6.3 % (0.0-7.3)   08/26/19  09:39    


 


Eos % (Auto)  1.5 % (0.0-4.3)   08/26/19  09:39    


 


Baso % (Auto)  0.5 % (0.0-1.8)   08/26/19  09:39    


 


Lymph #  2.7 K/mm3 (1.2-5.4)   08/26/19  09:39    


 


Mono #  0.4 K/mm3 (0.0-0.8)   08/26/19  09:39    


 


Eos #  0.1 K/mm3 (0.0-0.4)   08/26/19  09:39    


 


Baso #  0.0 K/mm3 (0.0-0.1)   08/26/19  09:39    


 


Seg Neutrophils %  53.4 % (40.0-70.0)   08/26/19  09:39    


 


Seg Neutrophils #  3.7 K/mm3 (1.8-7.7)   08/26/19  09:39    


 


PT  13.3 Sec. (12.2-14.9)   08/24/19  21:25    


 


PT  13.4 Sec. (12.2-14.9)   08/24/19  21:25    


 


INR  1.04  (0.87-1.13)   08/24/19  21:25    


 


INR  1.05  (0.87-1.13)   08/24/19  21:25    


 


APTT  27.7 Sec. (24.2-36.6)   08/24/19  21:25    


 


Sodium  145 mmol/L (137-145)   08/25/19  04:40    


 


Potassium  4.0 mmol/L (3.6-5.0)   08/25/19  04:40    


 


Chloride  107.5 mmol/L ()  H  08/25/19  04:40    


 


Carbon Dioxide  21 mmol/L (22-30)  L  08/25/19  04:40    


 


  21 mmol/L  08/25/19  04:40    


 


BUN  6 mg/dL (7-17)  L  08/25/19  04:40    


 


  0.6 mg/dL (0.7-1.2)  L  08/25/19  04:40    


 


Estimated GFR  > 60 ml/min  08/25/19  04:40    


 


  10 %  08/25/19  04:40    


 


Glucose  86 mg/dL ()   08/25/19  04:40    


 


Calcium  8.7 mg/dL (8.4-10.2)   08/25/19  04:40    


 


Magnesium  2.20 mg/dL (1.7-2.3)   08/24/19  21:25    


 


  0.20 mg/dL (0.1-1.2)   08/24/19  21:25    


 


AST  70 units/L (5-40)  H  08/24/19  21:25    


 


ALT  109 units/L (7-56)  H  08/24/19  21:25    


 


  72 units/L ()   08/24/19  21:25    


 


  166 units/L ()  H  08/24/19  21:25    


 


  8.6 g/dL (6.3-8.2)  H  08/24/19  21:25    


 


  4.7 g/dL (3.9-5)   08/24/19  21:25    


 


  1.2 %  08/24/19  21:25    


 


HCG, Qual  Negative  (Negative)   08/25/19  08:14    


 


  Straw  (Yellow)   08/24/19  23:18    


 


  Clear  (Clear)   08/24/19  23:18    


 


  5.0  (5.0-7.0)   08/24/19  23:18    


 


Ur Specific Gravity  1.006  (1.003-1.030)   08/24/19  23:18    


 


  <15 mg/dl mg/dL (Negative)   08/24/19  23:18    


 


  Neg mg/dL (Negative)   08/24/19  23:18    


 


  Neg mg/dL (Negative)   08/24/19  23:18    


 


  Neg  (Negative)   08/24/19  23:18    


 


  Neg  (Negative)   08/24/19  23:18    


 


  Neg  (Negative)   08/24/19  23:18    


 


  < 2.0 mg/dL (<2.0)   08/24/19  23:18    


 


Ur Leukocyte Esterase  Neg  (Negative)   08/24/19  23:18    


 


  1.0 /HPF (0.0-6.0)   08/24/19  23:18    


 


  < 1.0 /HPF (0.0-6.0)   08/24/19  23:18    


 


U Epithel Cells (Auto)  1.0 /HPF (0-13.0)   08/24/19  23:18    


 


Urine HCG, Qual  Negative  (Negative)   08/25/19  09:32    


 


Salicylates  < 0.3 mg/dL (2.8-20.0)  L  08/24/19  21:25    


 


  Presumptive negative   08/24/19  23:18    


 


  Presumptive negative   08/24/19  23:18    


 


Acetaminophen  < 5.0 ug/mL (10.0-30.0)  L  08/24/19  21:25    


 


Ur Barbiturates Screen  Presumptive negative   08/24/19  23:18    


 


Ur Phencyclidine Scrn  Presumptive negative   08/24/19  23:18    


 


Ur Amphetamines Screen  Presumptive negative   08/24/19  23:18    


 


U Benzodiazepines Scrn  Presumptive negative   08/24/19  23:18    


 


  Presumptive negative   08/24/19  23:18    


 


U Marijuana (THC) Screen  Presumptive negative   08/24/19  23:18    


 


  Disclamer   08/24/19  23:18    


 


Plasma/Serum Alcohol  0.18 % (0-0.07)  H  08/24/19  21:25    














Active Medications





- Current Medications


Current Medications: 














Generic Name Dose Route Start Last Admin





  Trade Name Freq  PRN Reason Stop Dose Admin


 


Acetaminophen  650 mg  08/24/19 23:35 





  Tylenol  PO  





  Q4H PRN  





  Pain MILD(1-3)/Fever >100.5/HA  


 


Enoxaparin Sodium  40 mg  08/25/19 10:00  08/25/19 09:44





  Lovenox  SUB-Q   40 mg





  QDAY CALI   Administration


 


Sodium Chloride  1,000 mls @ 150 mls/hr  08/26/19 08:00 





  Nacl 0.9% 1000 Ml  IV  





  AS DIRECT CALI  


 


Morphine Sulfate  2 mg  08/24/19 23:35 





  Morphine  IV  





  Q4H PRN  





  Pain, Moderate (4-6)  


 


Ondansetron HCl  4 mg  08/24/19 23:35 





  Zofran  IV  





  Q8H PRN  





  Nausea And Vomiting  


 


Pantoprazole Sodium  40 mg  08/26/19 10:00 





  Protonix  PO  





  BID CALI  


 


Sodium Chloride  10 ml  08/25/19 10:00  08/25/19 22:15





  Sodium Chloride Flush Syringe 10 Ml  IV   10 ml





  BID CALI   Administration


 


Sodium Chloride  10 ml  08/24/19 23:35 





  Sodium Chloride Flush Syringe 10 Ml  IV  





  PRN PRN  





  LINE FLUSH

## 2019-08-26 NOTE — PROGRESS NOTE
Subjective





- Reason for Consult


Consult date: 08/26/19


Reason for consult: Pscyhiatry Follow-up





- Chief Complaint


Chief complaint: 


21 y.o.  female who presented to the ER for suicide attempt by drinking 

bleach and ammonia per the record. Today the assessment could not be completed 

because the  phone isn't working. The patient's assigned nurse was 

informed that a certified / phone line is needed for me 

the provider to complete the psy assessment. 























Mental Status Exam





- Vital signs


                                Last Vital Signs











Temp  97.6 F   08/26/19 12:00


 


Pulse  82   08/26/19 13:00


 


Resp  14   08/26/19 12:00


 


BP  106/60   08/26/19 11:31


 


Pulse Ox  98   08/26/19 12:00














- Exam


Narrative exam: 


Unable to complete the MSE at this time, the  is still not working.  














Assessment and Plan


Impression: Today the assessment could not be completed at this time because the

 phone isn't working.





Recommendation/Plan: Continue 1013 and attempt to reassess the patient in 24 

hours. 





Staffed with Dr LUH Spencer.

## 2019-08-27 NOTE — PROGRESS NOTE
Assessment and Plan








21-year-old woman with no medical problems was brought to the emergency room for

evaluation.  Family at bedside state that she drinks 6 beers today, she then 

felt dizzy and does not remember what happened.  Also state that she has been de

mon possessed, edema and still hurry to do things to herself.  after drinking 6 

beers she drinks some cleaning agent, but she does not know the name, she was 

not aware that she was drinking 8, she said vivek told her to do it.  Currently

he has burning in her stomach, no psychiatric history





SI, attempt


1013, MH consult





Gastritis


-likely due to drinking caustic cleaning material


-EGD report reviewed, 


-diffuse gastritis seen on EGD. PPI, needs egd every 2-3 years for the next 10 

years, as her risk is increased for malignancies. 





dvt ppx- scds and early ambulation





-Disposition: Mediclally lceared for D/c. When cleared by Psych








Subjective


Date of service: 08/27/19


Principal diagnosis: bleach ingestion


Interval history: 








Lying quietly in bed in no distress.  Speaks little English.  Tolerating regular

diet








Objective





- Exam


Narrative Exam: 





Constitutional: Well-nourished well-developed.  In no distress


Head: Normocephalic atraumatic


Eyes: Pupils are equal round and reactive to light


Nose: No enlarged turbinates, no septal deviation.


Mouth: Moist mucous membranes.


Neck: Supple no thyromegaly.  No bruit.  No JVD


Heart: Regular rate and rhythm, S1-S2 normal.  No rubs murmurs or gallop


Lungs: Clear to auscultation bilaterally. no rales or rhonchi


Abdomen: Soft, nontender. Bowel sound are present. 


Extremities: No edema, no cyanosis, no clubbing.


Neuro: Alert oriented Oriented x3. No focal sensory or motor deficit.


Skin: No rashes or hyperpigmented spots


Musculoskeletal system: No joint pain or swelling


Hematological: No petechia or subcutanous hemorrhages.


Immunological: No multiple septic spots on the skin


Lymphatic: No generalized lymphadenopathy


Psychiatry: Euthymic. Calm.














- Constitutional


Vitals: 


                               Vital Signs - 12hr











  08/27/19 08/27/19





  06:18 12:17


 


Temperature 98.2 F 


 


Pulse Rate 75 64


 


Respiratory 16 18





Rate  


 


Blood Pressure 119/78 


 


Blood Pressure  99/61





[Right]  


 


O2 Sat by Pulse 97 99





Oximetry  














- Labs


CBC & Chem 7: 


                                 08/26/19 09:39





                                 08/26/19 09:39

## 2019-08-27 NOTE — PROGRESS NOTE
Subjective





- Reason for Consult


Consult date: 08/27/19


Reason for consult: Psychiatry Follow-up





- Chief Complaint


Chief complaint: 


"I drink bleach by mistake"





21 y.o.  female who presented to the ER for suicide attempt by drinking 

bleach and ammonia per the record. Today the patient was calm during the 

assessment. She stated that she was drinking a beer and mistakenly drink 

bleach/ammonia while in the laundry room of her home. She was guarded when asked

questions about her mental health and current situation. Per collateral 

information from her  Sukhdeep Brambila who was at the bedside, he stated that

the police was at their home when his wife drink the bleach/ammonia. He would 

not state why the police was at his home. He believe that his wife may have made

a mistake by drinking both substances. The patient denies SI/HI's and AVH's. She

stated that she does not normally drink (etoh) to get intoxicated. She denies 

recreational drug use. 








Mental Status Exam





- Vital signs


                                Last Vital Signs











Temp  98.2 F   08/27/19 06:18


 


Pulse  64   08/27/19 12:17


 


Resp  18   08/27/19 12:17


 


BP  99/61   08/27/19 12:17


 


Pulse Ox  99   08/27/19 12:17














- Exam


Narrative exam: 


MSE:





 Appearance: calm, cooperative   


 Behavior: regular eye contact 


 Speech: regular rate and tone


 Mood: somewhat guarded


 Affect: congruent to mood 


 Thought Process: circumstantial 


 Thought Content: denies SI/HI's and AVH's  


 Motor Activity: sitting up in bed  


 Cognition: A/O x3  


 Insight: variable 


 Judgment: poor 






















































































  

















  

























































































  



































  

















  





























  








  





























  

















  

















  












































Assessment and Plan


Impression: Intentional ingestion of bleach and ammonia. The patient was 

intoxicated on arrival to the ER. Today the patient was calm and cooperative 

during the assessment. 





Recommendation/Plan: Continue 1013 and discuss more details with the patient 

reference her actions. 





Dispo: The patient was referred to inpatient psy services 





Will staff with Dr LUH Spencer.

## 2019-08-28 NOTE — PROGRESS NOTE
Assessment and Plan








21-year-old woman with no medical problems was brought to the emergency room for

evaluation.  Family at bedside state that she drinks 6 beers today, she then 

felt dizzy and does not remember what happened.  Also state that she has been de

mon possessed, edema and still hurry to do things to herself.  after drinking 6 

beers she drinks some cleaning agent, but she does not know the name, she was 

not aware that she was drinking 8, she said vivek told her to do it.  Currently

he has burning in her stomach, no psychiatric history





SI, attempt


1013, MH consulted and following





Gastritis


-likely due to drinking caustic cleaning material


-EGD report reviewed, 


-diffuse gastritis seen on EGD. PPI, needs egd every 2-3 years for the next 10 

years, as her risk is increased for malignancies. 





dvt ppx- scds and early ambulation





-Disposition: Mediclally cleared for D/c, when cleared by Psych








Subjective


Date of service: 08/28/19


Principal diagnosis: bleach ingestion


Interval history: 








Lying quietly in bed in no distress.  Speaks little English.  Tolerating regular

diet. No new complaint.








Objective





- Exam


Narrative Exam: 





Constitutional: Well-nourished well-developed. In no distress


Head: Normocephalic atraumatic


Eyes: Pupils are equal round and reactive to light


Nose: No enlarged turbinates, no septal deviation.


Mouth: Moist mucous membranes.


Neck: Supple no thyromegaly.  No bruit.  No JVD


Heart: Regular rate and rhythm, S1-S2 normal.  No rubs murmurs or gallop


Lungs: Clear to auscultation bilaterally. no rales or rhonchi


Abdomen: Soft, nontender. Bowel sound are present. 


Extremities: No edema, no cyanosis, no clubbing.


Neuro: Alert oriented Oriented x3. No focal sensory or motor deficit.


Skin: No rashes or hyperpigmented spots


Musculoskeletal system: No joint pain or swelling


Hematological: No petechia or subcutanous hemorrhages.


Immunological: No multiple septic spots on the skin


Lymphatic: No generalized lymphadenopathy


Psychiatry: Euthymic. Calm.














- Constitutional


Vitals: 


                               Vital Signs - 12hr











  08/28/19 08/28/19 08/28/19





  07:48 12:44 17:42


 


Temperature  99.1 F 99.0 F


 


Pulse Rate  74 93 H


 


Respiratory  18 18





Rate   


 


Blood Pressure  112/61 107/68


 


O2 Sat by Pulse 98 96 97





Oximetry   














- Labs


CBC & Chem 7: 


                                 08/26/19 09:39





                                 08/26/19 09:39

## 2019-08-28 NOTE — PROGRESS NOTE
Subjective





- Reason for Consult


Consult date: 08/28/19


Reason for consult: Psychiatric Follow-up Evaluation





- Chief Complaint


Chief complaint: 


"I feel better"





Patient is a 21 y.o.  female who presented to the ER for suicide attempt

by drinking bleach and ammonia per the record. Today the patient is  calm and 

cooperative during the assessment.  ( [id number]) used via 

language line to communicate with patient. She states, " I feel better. I was 

not trying to hurt myself" She continues to be guarded during the assessment. 

She reports normal sleep and appetite. She endorses good support system ( 

 and sister ).  She denies SI/HI's, A/VH's, and delusions.  














Mental Status Exam





- Vital signs


                                Last Vital Signs











Temp  99.2 F   08/27/19 21:45


 


Pulse  66   08/27/19 21:45


 


Resp  16   08/27/19 21:45


 


BP  107/62   08/27/19 21:45


 


Pulse Ox  97   08/27/19 21:45














- Exam


Narrative exam: 


Mental Status Exam





 Appearance: calm, cooperative   


 Behavior: regular eye contact 


 Speech: regular rate and tone


 Mood: " I feel better"; somewhat guarded


 Affect: congruent to mood 


 Thought Process: circumstantial 


 Thought Content: denies SI/HI's, AVH's, delusions  


 Motor Activity: sitting up in bed  


 Cognition: A/O x 3  


 Insight: variable 


 Judgment: poor 














Assessment and Plan


Impression: Intentional ingestion of bleach and ammonia. The patient was 

intoxicated on arrival to the ER. Today the patient is calm and cooperative 

during the assessment. Patient continues to be somewhat guarded during the 

assessment. She denies SI/HI's, A/VH's, and delusions. 





Recommendation/Plan: 


1. Continue 1013.  Will reassess in 24 hours. 


2. At this time patient prefers talk therapy. She refuses medication for 

depression.  





Disposition: The patient was referred to inpatient psychiatric services. 





Will staff with Dr. LUH Pandey.

## 2019-08-29 NOTE — PROGRESS NOTE
Assessment and Plan








21-year-old woman with no medical problems was brought to the emergency room for

evaluation.  Family at bedside state that she drinks 6 beers today, she then 

felt dizzy and does not remember what happened.  Also state that she has been de

mon possessed, edema and still hurry to do things to herself.  after drinking 6 

beers she drinks some cleaning agent, but she does not know the name, she was 

not aware that she was drinking 8, she said vivek told her to do it.  Currently

he has burning in her stomach, no psychiatric history





- SI, attempt


1013, MH consulted and following\





- Worsening LFT


IV hydration





Gastritis


-likely due to drinking caustic cleaning material


-EGD report reviewed, 


-diffuse gastritis seen on EGD. PPI, needs egd every 2-3 years for the next 10 

years, as her risk is increased for malignancies. 





dvt ppx- scds and early ambulation





-Disposition: D/c if LFT is trending down words





Subjective


Date of service: 08/29/19


Principal diagnosis: bleach ingestion


Interval history: 








Lying quietly in bed in no distress.  Speaks little English. Tolerating regular 

diet. No new complaint.








Objective





- Exam


Narrative Exam: 





Constitutional: Well-nourished well-developed.   In no distress


Head: Normocephalic atraumatic


Eyes: Pupils are equal round and reactive to light


Nose: No enlarged turbinates, no septal deviation.


Mouth: Moist mucous membranes.


Neck: Supple no thyromegaly.  No bruit.  No JVD


Heart: Regular rate and rhythm, S1-S2 normal.  No rubs murmurs or gallop


Lungs: Clear to auscultation bilaterally. no rales or rhonchi


Abdomen: Soft, nontender. Bowel sound are present. 


Extremities: No edema, no cyanosis, no clubbing.


Neuro: Alert oriented Oriented x3. No focal sensory or motor deficit.


Skin: No rashes or hyperpigmented spots


Musculoskeletal system: No joint pain or swelling


Hematological: No petechia or subcutanous hemorrhages.


Immunological: No multiple septic spots on the skin


Lymphatic: No generalized lymphadenopathy


Psychiatry: Euthymic. Calm.














- Constitutional


Vitals: 


                               Vital Signs - 12hr











  08/29/19 08/29/19





  11:56 16:46


 


Temperature 99.6 F 99.4 F


 


Pulse Rate 86 98 H


 


Respiratory 22 22





Rate  


 


Blood Pressure 108/68 127/74


 


O2 Sat by Pulse 97 97





Oximetry  














- Labs


CBC & Chem 7: 


                                 08/29/19 04:42





                                 08/29/19 04:42


Labs: 


                              Abnormal lab results











  08/29/19 08/29/19 Range/Units





  04:42 04:42 


 


RDW  12.8 L   (13.2-15.2)  %


 


Potassium   3.5 L  (3.6-5.0)  mmol/L


 


BUN   6 L  (7-17)  mg/dL


 


Creatinine   0.5 L  (0.7-1.2)  mg/dL


 


AST   70 H  (5-40)  units/L


 


ALT   123 H  (7-56)  units/L

## 2019-08-29 NOTE — PROGRESS NOTE
Subjective





- Reason for Consult


Consult date: 08/29/19


Reason for consult: Psychiatry Follow-up





- Chief Complaint


Chief complaint: 


"I love myself and family"





Patient is a 21 y.o.  female who presented to the ER for suicide attempt

by drinking bleach and ammonia per the record. Today the patient was calm and 

cooperative during the assessment. The  line ([id number]) was 

used. The patient and her  stated that the police (previous notes) was at

their home because of a altercation between family members. The patient stated 

that 2 other families reside at their home. She is adamant that the bleach 

bottle didn't have a cap on it when she mistakenly ingested it. She stated that 

she never swallowed the bleach. Her  Mr Meade stated that his wife 

actions were a mistake. The patient's  have been at the bedside the 

entire seema she have been in the hospital. The patient denies SI/HI's and AVH's.

Per the staff, the patient have been pleasant since her arrival to the hospital.













Mental Status Exam





- Vital signs


                                Last Vital Signs











Temp  99.6 F   08/29/19 11:56


 


Pulse  86   08/29/19 11:56


 


Resp  22   08/29/19 11:56


 


BP  108/68   08/29/19 11:56


 


Pulse Ox  97   08/29/19 11:56














- Exam


Narrative exam: 


MSE:





 Appearance: calm, cooperative   


 Behavior: regular eye contact 


 Speech: regular rate and tone


 Mood: "okay"


 Affect: congruent to mood 


 Thought Process: logical 


 Thought Content: denies SI/HI's and AVH's  


 Motor Activity: sitting up in bed  


 Cognition: A/O x3  


 Insight: appropriate 


 Judgment: appropriate 














Assessment and Plan


Impression: Today the patient was calm and cooperative during the assessment. 

The patient is no threat to self.  





DDx: R/O MDD





Suicide Risk Assessment





I. This screening and assessment is based on information collected from the 

following sources:


II.  SUICIDE RISK SCREENING (within last 30 days):


A.) Suicidal thoughts/behaviors: Yes





SUICIDE RISK ASSESSMENT


III.  FACTORS THAT INCREASE RISK:





A.) Demographic and Substance Use Factors: Yes (Marijuana, Cocaine, and 

Marijuana)





B.) Current/Recent Factors (within past 3 months):


Psychosocial/Environmental Factors: NA


Physical Illness: None


Cognitive/Psychological Factors: None


 


C.) Historical Factors: None





D.) Diagnostic/Symptom/Treatment Factors: None





E.) Acute Risk Factor Severity


(DESC; MILD/MOD/SEVERE): Mild





Other factors for this individual that increase risk: None





IV.  FACTORS THAT DECREASE RISK: 


Resilience/Protective Factors: NA 


Other factors for this individual that decrease risk: Patient denies a desire to

harm self





V. Clinician's Formulation of Risk and Determination of level of Care: 





This is 21 y.o.  female who ingested bleach prior to coming to the ER. 

She stated that she was not trying to kill herself. She acknowledged that she 

was intoxicated when she drink bleach by mistake. Since being hospitalized the 

patient has consistently denied the desire to harm herself. The patient is not 

impaired by substance. She is able to take care of her ADLs and is not at 

imminent risk of harm to self or others. Consequently, it is the opinion of the 

treatment team that the patient is at low risk of suicide and does not meet 

criteria to continue an involuntary psychiatric hold.  





Estimation of Imminent Risk: Low due to the above explanation.





Determination of Level of Care based on Suicide Risk: Outpatient follow-up. 





VI.  Plan and Interventions based on Suicide Risk: This patient will likely be 

stepped down to an outpatient mental health center in the community upon 

discharge and follow-up within 7 days of her discharge from the hospital.





VII.  Discharge/After Hours Support Plan: Patient can return back to the ER, 

call 911 or crisis line if symptoms of depression, anxiety, suicidality return. 





Recommendation/Plan: Rescind 1013. Discussed the importance to abstain from 

excessive alcohol consumption (etoh). 





Dispo: The patient was given a referral for [facility] for rehab services 

if indicated. 





Will staff with Dr LUH Spencer.

## 2019-08-30 NOTE — DISCHARGE SUMMARY
Providers





- Providers


Date of Admission: 


08/24/19 23:35





Date of discharge: 08/30/19


Attending physician: 


CHELO HURLEY





                                        





08/24/19 21:16


Consult to Physician [CONS] Urgent 


   Comment: 


   Consulting Provider: SAUL CATALAN


   Physician Instructions: 


   Reason For Exam: toxig bleach ingestion





08/24/19 21:17


Consult to Mental Health [CONS] Urgent 


   Reason For Exam: psych


   Place consult to:: cns on call


   Notified:: awaiting call back





08/24/19 23:37


psychiatry consult [Consult to Mental Health] [CONS] Routine 


   Reason For Exam: ingestion of caustic agent, SI


   Place consult to:: PSYCH


   Notified::  on 8/24/19


   Comment::  has seen patient.











Primary care physician: 


[facility], [place]








Hospitalization


Reason for admission: suicidal ideation by drinking caustic soda


Condition: Serious


Pertinent studies: 











CT scan of the head was unremarkable.  Chest x-ray was normal.


Chest x-ray was normal








Procedures: 





EGD that shows antral gastritis


Hospital course: 





21-year-old woman with no medical problems was brought to the emergency room for

 evaluation.  Family at bedside state that she drinks 6 beers today, she then 

felt dizzy and does not remember what happened.  Also state that she has been 

demon possessed, edema and still hurry to do things to herself.  Today after 

drinking 6 beers she drinks some cleaning agent, but she does not know the name,

 she was not aware that she was drinking 8, she said demons told her to do it.  

Currently s has burning in her stomach, no psychiatric history.  Most on PPI.  

GI consult was obtained.  EGD was done.  Antral gastritis identified.  Psych 

consult obtained.  After evaluation patient's was 1013, was determined to be 

safe to be discharged to her family.  She was discharged today to follow primary

 care physician in 2-5 days.  Also to follow up with a psychiatrist in 1 week.  

Patient has verbalized no intention of hurting herself or hurt somebody.  She 

claimed that this was.  And accidents-been drinking a lot of be that she didn't 

distinguishing which was caustic soda which was being.  However distillery is b

eing takes with lenticular with a .  The psychiatric said it was safe to 

discharge the patient to her family.  








Disposition: DC-01 TO HOME OR SELFCARE


Time spent for discharge: 35 min





- Discharge Diagnoses


(1) Ingestion of caustic substance


Status: Acute   





(2) Suicidal behavior with attempted self-injury


Status: Acute   





Core Measure Documentation





- Palliative Care


Palliative Care/ Comfort Measures: Not Applicable





- Core Measures


Any of the following diagnoses?: none





Exam





- Physical Exam


Narrative exam: 





Constitutional: Well-nourished well-developed. In no distress


Head: Normocephalic atraumatic


Eyes: Pupils are equal round and reactive to light


Nose: No enlarged turbinates, no septal deviation.


Mouth: Moist mucous membranes.


Neck: Supple no thyromegaly.  No bruit.  No JVD


Heart: Regular rate and rhythm, S1-S2 normal.  No rubs murmurs or gallop


Lungs: Clear to auscultation bilaterally. no rales or rhonchi


Abdomen: Soft, nontender. Bowel sound are present. 


Extremities: No edema, no cyanosis, no clubbing.


Neuro: Alert oriented Oriented x3. No focal sensory or motor deficit.


Skin: No rashes or hyperpigmented spots


Musculoskeletal system: No joint pain or swelling


Hematological: No petechia or subcutanous hemorrhages.


Immunological: No multiple septic spots on the skin


Lymphatic: No generalized lymphadenopathy


Psychiatry: Euthymic. Calm.














- Constitutional


Vitals: 


                                        











Temp Pulse Resp BP Pulse Ox


 


 98.3 F   87   19   134/74   97 


 


 08/30/19 12:47  08/30/19 12:47  08/30/19 12:47  08/30/19 12:47  08/30/19 12:47














Plan


Follow up with: 


Iraj Banerjee [facility] [Outside] - 09/03/19 7:45 am


(Monday - Friday 8:00am - 5:00pm.


Walk-In only  


Must arrive at 7:45 as available slots are first come, first served.





To be eligible for services, you must bring the following with you:


1. Proof of Identity


2. Proof of Residence


3. Proof of Income


4. Insurance Cards


5. Referral documents and/or hospital discharge papers.)


[facility] [Primary Care Provider] - 3-5 Days


Prescriptions: 


Pantoprazole [Protonix TAB] 40 mg PO QDAY #30 tablet